# Patient Record
(demographics unavailable — no encounter records)

---

## 2024-11-06 NOTE — PHYSICAL EXAM
[Normocephalic] : normocephalic [Atraumatic] : atraumatic [Supple] : supple [No Supraclavicular Adenopathy] : no supraclavicular adenopathy [No Cervical Adenopathy] : no cervical adenopathy [Examined in the supine and seated position] : examined in the supine and seated position [No dominant masses] : no dominant masses in right breast  [No dominant masses] : no dominant masses left breast [No Nipple Retraction] : no left nipple retraction [No Nipple Discharge] : no left nipple discharge [No Axillary Lymphadenopathy] : no left axillary lymphadenopathy [No Edema] : no edema [No Rashes] : no rashes [No Ulceration] : no ulceration [Normal Sinus Rhythm] : normal sinus rhythm [de-identified] : +thyromegaly > followed [de-identified] : +FC tissue NSF  [de-identified] : S/P MRM w/o Reconstx. NER. %. No lymphedema.

## 2024-11-06 NOTE — HISTORY OF PRESENT ILLNESS
[FreeTextEntry1] : S/P  L MRM ()([facility]) S/P chemo ([facility] Covarrubias) BRCA (My[employer]pr)(12): - Father  () of Ca at 90yo Sa Dr Sidhu for thyroid eval > Sono > S/P FNA (3/23): "Benign" Colonoscopy(): "WNL" > 10yrs  PAP/Pelvic (>5yrs): discussed  Got Moderna booster ()(RUE) No other MH/FH changes. ROS reviewed/discussed. Taking Ca/Vit D. Last Bone Densitometry (): +osteoporosis R Mammo/R Sono (24): HD, +stable 8mm mass R 9:00 N3, NSF